# Patient Record
Sex: FEMALE | Race: WHITE | NOT HISPANIC OR LATINO | Employment: OTHER | URBAN - METROPOLITAN AREA
[De-identification: names, ages, dates, MRNs, and addresses within clinical notes are randomized per-mention and may not be internally consistent; named-entity substitution may affect disease eponyms.]

---

## 2017-09-07 ENCOUNTER — AMBULATORY - HEALTHEAST (OUTPATIENT)
Dept: LAB | Facility: HOSPITAL | Age: 57
End: 2017-09-07

## 2017-09-07 DIAGNOSIS — Z94.2 LUNG REPLACED BY TRANSPLANT (H): ICD-10-CM

## 2017-09-07 DIAGNOSIS — Z79.899 ENCOUNTER FOR LONG-TERM (CURRENT) USE OF OTHER MEDICATIONS: ICD-10-CM

## 2017-09-07 LAB
CREAT SERPL-MCNC: 0.82 MG/DL (ref 0.6–1.1)
GFR SERPL CREATININE-BSD FRML MDRD: >60 ML/MIN/1.73M2

## 2022-01-01 ENCOUNTER — HOSPITAL ENCOUNTER (EMERGENCY)
Facility: HOSPITAL | Age: 62
Discharge: HOME | End: 2022-03-01
Admitting: PHYSICIAN ASSISTANT
Payer: MEDICARE

## 2022-01-01 ENCOUNTER — APPOINTMENT (OUTPATIENT)
Dept: CT IMAGING | Facility: HOSPITAL | Age: 62
End: 2022-01-01
Attending: EMERGENCY MEDICINE
Payer: MEDICARE

## 2022-01-01 VITALS
DIASTOLIC BLOOD PRESSURE: 75 MMHG | SYSTOLIC BLOOD PRESSURE: 127 MMHG | BODY MASS INDEX: 31.72 KG/M2 | HEIGHT: 61 IN | TEMPERATURE: 98.2 F | HEART RATE: 74 BPM | OXYGEN SATURATION: 92 % | WEIGHT: 168 LBS | RESPIRATION RATE: 27 BRPM

## 2022-01-01 DIAGNOSIS — J96.21 ACUTE ON CHRONIC RESPIRATORY FAILURE WITH HYPOXIA (H): ICD-10-CM

## 2022-01-01 DIAGNOSIS — J84.112 IPF (IDIOPATHIC PULMONARY FIBROSIS) (H): ICD-10-CM

## 2022-01-01 DIAGNOSIS — R79.89 ELEVATED BRAIN NATRIURETIC PEPTIDE (BNP) LEVEL: ICD-10-CM

## 2022-01-01 LAB
ANION GAP SERPL CALCULATED.3IONS-SCNC: 13 MMOL/L (ref 5–18)
ATRIAL RATE - MUSE: 72 BPM
BASOPHILS # BLD AUTO: 0 10E3/UL (ref 0–0.2)
BASOPHILS NFR BLD AUTO: 0 %
BNP SERPL-MCNC: 133 PG/ML (ref 0–98)
BUN SERPL-MCNC: 24 MG/DL (ref 8–22)
CALCIUM SERPL-MCNC: 8.7 MG/DL (ref 8.5–10.5)
CHLORIDE BLD-SCNC: 103 MMOL/L (ref 98–107)
CO2 SERPL-SCNC: 22 MMOL/L (ref 22–31)
CREAT SERPL-MCNC: 1.48 MG/DL (ref 0.6–1.1)
DIASTOLIC BLOOD PRESSURE - MUSE: NORMAL MMHG
EOSINOPHIL # BLD AUTO: 0 10E3/UL (ref 0–0.7)
EOSINOPHIL NFR BLD AUTO: 0 %
ERYTHROCYTE [DISTWIDTH] IN BLOOD BY AUTOMATED COUNT: 16.7 % (ref 10–15)
FLUAV RNA SPEC QL NAA+PROBE: NEGATIVE
FLUBV RNA RESP QL NAA+PROBE: NEGATIVE
GFR SERPL CREATININE-BSD FRML MDRD: 40 ML/MIN/1.73M2
GLUCOSE BLD-MCNC: 147 MG/DL (ref 70–125)
HCT VFR BLD AUTO: 29.4 % (ref 35–47)
HGB BLD-MCNC: 8.8 G/DL (ref 11.7–15.7)
HOLD SPECIMEN: NORMAL
IMM GRANULOCYTES # BLD: 0.1 10E3/UL
IMM GRANULOCYTES NFR BLD: 2 %
INTERPRETATION ECG - MUSE: NORMAL
LYMPHOCYTES # BLD AUTO: 1.9 10E3/UL (ref 0.8–5.3)
LYMPHOCYTES NFR BLD AUTO: 49 %
MCH RBC QN AUTO: 31.8 PG (ref 26.5–33)
MCHC RBC AUTO-ENTMCNC: 29.9 G/DL (ref 31.5–36.5)
MCV RBC AUTO: 106 FL (ref 78–100)
MONOCYTES # BLD AUTO: 0.4 10E3/UL (ref 0–1.3)
MONOCYTES NFR BLD AUTO: 11 %
NEUTROPHILS # BLD AUTO: 1.5 10E3/UL (ref 1.6–8.3)
NEUTROPHILS NFR BLD AUTO: 38 %
NRBC # BLD AUTO: 0 10E3/UL
NRBC BLD AUTO-RTO: 0 /100
P AXIS - MUSE: 50 DEGREES
PLATELET # BLD AUTO: 227 10E3/UL (ref 150–450)
POTASSIUM BLD-SCNC: 4.4 MMOL/L (ref 3.5–5)
PR INTERVAL - MUSE: 150 MS
QRS DURATION - MUSE: 86 MS
QT - MUSE: 412 MS
QTC - MUSE: 451 MS
R AXIS - MUSE: 11 DEGREES
RBC # BLD AUTO: 2.77 10E6/UL (ref 3.8–5.2)
SARS-COV-2 RNA RESP QL NAA+PROBE: NEGATIVE
SODIUM SERPL-SCNC: 138 MMOL/L (ref 136–145)
SYSTOLIC BLOOD PRESSURE - MUSE: NORMAL MMHG
T AXIS - MUSE: 34 DEGREES
TROPONIN I SERPL-MCNC: 0.01 NG/ML (ref 0–0.29)
VENTRICULAR RATE- MUSE: 72 BPM
WBC # BLD AUTO: 3.8 10E3/UL (ref 4–11)

## 2022-01-01 PROCEDURE — C9803 HOPD COVID-19 SPEC COLLECT: HCPCS

## 2022-01-01 PROCEDURE — 250N000011 HC RX IP 250 OP 636: Performed by: EMERGENCY MEDICINE

## 2022-01-01 PROCEDURE — 99285 EMERGENCY DEPT VISIT HI MDM: CPT | Mod: 25

## 2022-01-01 PROCEDURE — 71275 CT ANGIOGRAPHY CHEST: CPT

## 2022-01-01 PROCEDURE — 87636 SARSCOV2 & INF A&B AMP PRB: CPT | Performed by: PHYSICIAN ASSISTANT

## 2022-01-01 PROCEDURE — 85025 COMPLETE CBC W/AUTO DIFF WBC: CPT | Performed by: EMERGENCY MEDICINE

## 2022-01-01 PROCEDURE — 83880 ASSAY OF NATRIURETIC PEPTIDE: CPT | Performed by: PHYSICIAN ASSISTANT

## 2022-01-01 PROCEDURE — 84484 ASSAY OF TROPONIN QUANT: CPT | Performed by: EMERGENCY MEDICINE

## 2022-01-01 PROCEDURE — 93005 ELECTROCARDIOGRAM TRACING: CPT | Performed by: EMERGENCY MEDICINE

## 2022-01-01 PROCEDURE — 82310 ASSAY OF CALCIUM: CPT | Performed by: EMERGENCY MEDICINE

## 2022-01-01 PROCEDURE — 36415 COLL VENOUS BLD VENIPUNCTURE: CPT | Performed by: STUDENT IN AN ORGANIZED HEALTH CARE EDUCATION/TRAINING PROGRAM

## 2022-01-01 RX ORDER — IOPAMIDOL 755 MG/ML
75 INJECTION, SOLUTION INTRAVASCULAR ONCE
Status: COMPLETED | OUTPATIENT
Start: 2022-01-01 | End: 2022-01-01

## 2022-01-01 RX ADMIN — IOPAMIDOL 75 ML: 755 INJECTION, SOLUTION INTRAVENOUS at 18:11

## 2022-01-01 ASSESSMENT — ENCOUNTER SYMPTOMS
DYSURIA: 0
NAUSEA: 0
FEVER: 0
CHILLS: 0
HEMATURIA: 0
NERVOUS/ANXIOUS: 0
SORE THROAT: 0
ABDOMINAL PAIN: 0
LIGHT-HEADEDNESS: 0
DIARRHEA: 0
FREQUENCY: 0
HEADACHES: 0
COUGH: 1
SHORTNESS OF BREATH: 1
MYALGIAS: 0
VOMITING: 0

## 2022-03-01 NOTE — ED TRIAGE NOTES
Sob and recommended by care coordinator nurse to come in for eval to check if she has a PE. Patient states home oxygen has been alittle bit lower as well then here baseline.

## 2022-03-01 NOTE — ED PROVIDER NOTES
ED triage provider note    Patient here for evaluation of dyspnea.  She has a history of chronic respiratory failure on oxygen.  She has had a lung transplant.  Recent positive D-dimer that was not followed up on    She is not in any distress.  Pulse ox is a bit low.    Diagnostics ordered including CT of the chest to rule out PE     Lit Reeves MD  03/01/22 4978

## 2022-03-02 NOTE — ED NOTES
RN spoke with patient regarding reason for ER visit. Pt said she was told to come to the ER by her provider for elevated d-dimer results. 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 COVID swab done and sent to lab.

## 2022-03-02 NOTE — DISCHARGE INSTRUCTIONS
You were seen in the emergency department for low oxygen levels. Your heart failure marker was a little elevated. This could also be from your idiopathic pulmonary fibrosis.    Take care of yourself at home.  - use oxygen as needed    For pain or fever you may take:  - Ibuprofen 600 mg every 6 hours. Max 3200 mg in 24 hours  - Please take this medication with food as it can cause an upset stomach  - Please do not take this medication if you have a history of a GI bleed or kidney problems  - Tylenol 650 mg every 6 hours. Max 4000 mg in 24 hours.   - Please do not take this medication with alcohol as it can cause problems with your liver.  - You can take both of these medications at the same time or alternate them every 3 hours. For example, tylenol at 6a, ibuprofen at 9a, tylenol at 12p, ibuprofen at 3p, etc.     Please follow up with your [facility] team as soon as possible to ensure your symptoms are improving.    Return to the emergency department if:  - You develop a fever (100.4F or above)  - Your symptoms worsen  - Severe leg swelling  - Unable to meet oxygen needs at home  - chest pain  - fainting  - Or with any other concerning symptom

## 2022-03-02 NOTE — ED PROVIDER NOTES
Emergency Department Encounter   NAME: Karo Carty ; AGE: 61 year old female ; YOB: 1960 ; MRN: [id number] ; EVALUATION DATE & TIME: 3/1/2022  6:08 PM ; PCP: No Ref-Primary, Physician   ED PROVIDER: Shahrzad Jansen PA-C    Chief Complaint   Patient presents with     Shortness of Breath       Medical Decision Making & Final Diagnosis   No diagnosis found.     ED Course as of 03/01/22 2220   Tue Mar 01, 2022   2212 Karo is a 61 year old female with a relevant PMH of idiopathic pulmonary fibrosis s/p L sided lung transplant on immunosuppression therapy, CAD, h/o etoh abuse, HLD, hypothyroidism who presents to the ED for evaluation of  Increased oxygen needs and shortness of breath.     My exam is notable for oxygen saturation in the low 90s on room air.  Clear lung sounds.  Mild 1+ pitting edema to bilateral lower extremities without tenderness.  No JVD.   2213 EKG reveals sinus rhythm.  No acute ST elevation or depression.  No pathologic arrhythmia.  Labs notable for leukopenia at baseline at 3.8, hemoglobin 8.8 (down from 10 9 days ago, , negative troponin, and slight increase in creatinine to 1.48 up from 1.38 days ago.  Covid and influenza negative.  CT PE study reveals idiopathic pulmonary fibrosis without significant change from baseline PE.   2214 Patient notes 2 weeks of increasing oxygen needs and shortness of breath.  Clinically she looks well and does have oxygen at home.  She is intermittently maintained her oxygen saturation on room air but with ambulation does D-Stat.  She is closely followed by [facility] and they recommended she come in today for PE rule out.     2215 PE study negative for acute abnormality.  No pneumonia, pleural effusion, or increased pulmonary vasculature.  BNP mildly elevated and mild lower extremity swelling.  She does have an echo planned next week with [facility].  Nothing to suggest fulminant heart failure at this time and do not believe there is indication for  diuresis or for acute heart failure work-up especially with ultrasound outpatient next week.  Low suspicion of infectious source with negative Covid, influenza, and negative CT for consolidation.  She denies any melena or bloody stool and is not anticoagulated.  Her hemoglobin is slightly down from baseline but not within a range that would make me most suspicious for occult GI bleed leading to severe anemia with symptomatic hypoxia.  No metabolic derangement leading to shortness of breath.  Nothing to suggest cardiac arrhythmia.  Likely secondary to worsening idiopathic pulmonary fibrosis.  May be component of heart failure.  I offered admission for observation given increased oxygen needs but patient declined.  She does have very close follow-up with [facility].  She states she can discuss with them tomorrow.  She said they could move her appointment up if her symptoms worsen.  She does have a battery of test scheduled next week and they recommended presenting to [place] ED if worsening.  I did attempt to contact her pulmonary team but unfortunately was connected with around 1.  Given the patient is so well-appearing here, is stable without oxygen here, has oxygen at home so she close follow-up with her care team I do think it is reasonable to disposition outpatient.  We discussed strict return precautions and she was given written verbal discharge instructions          ED Course   6:11 PM I attempted to meet and introduce myself to the patient.She is in CT. Will try again.  I did see the patient while wearing full COVID-compliant PPE.  6:28 PM Evaluated patient at the bedside  7:54 PM Discussed results. Discussed disposition.   8:10 PM Dr. Barahona from the general pulmonology team responded to the page. He recommends discussing with lung transplant team.       MEDICATIONS GIVEN IN THE EMERGENCY:   Medications   iopamidol (ISOVUE-370) solution 75 mL (75 mLs Intravenous Given 3/1/22 1811)      NEW PRESCRIPTIONS STARTED  AT TODAY'S ER VISIT:  New Prescriptions    No medications on file     =================================================================   History   Patient information was obtained from: patient   Use of Intrepreter: N/A    Karo Carty is a 61 year old female with a relevant PMH of idiopathic pulmonary fibrosis s/p L sided lung transplant on immunosuppression therapy, CAD, h/o etoh abuse, HLD, hypothyroidism who presents to the ED for evaluation of  Increased oxygen needs and shortness of breath.   2 weeks ago patient would occasionally use oxygen at home with increased activity.  In the last 2 weeks she has had significant increase shortness of breath and need for oxygen even when at rest.  She is waking up with oxygen saturation in the low 80s.  She reports left-sided rib pain which has been present since her original lung transplant in 2017.  Also notes mild lower extremity edema that has developed.  She has a chronic cough which is unchanged.  She has a as needed inhalers which she has not felt the need to increase the use of.  No sick contacts.  Denies any other complaints.  Has been vaccinated against COVID-19 x3.  Notes chronic headache and myalgias which have come and gone for months.  ______________________________________________________________________  No past medical history on file.     Past Surgical History:   Procedure Laterality Date     LUNG BIOPSY Left        No family history on file.    Social History     Tobacco Use     Smoking status: Former Smoker     Quit date: 2006     Years since quittin.1     Smokeless tobacco: Not on file   Substance Use Topics     Alcohol use: No     Comment: Alcoholic Drinks/day: History of alcohol abuse- in remission     Drug use: No       REVIEW OF SYSTEMS:    Review of Systems   Constitutional: Negative for chills and fever.   HENT: Negative for sore throat.    Eyes: Negative for visual disturbance.   Respiratory: Positive for cough and shortness of  "breath.    Cardiovascular: Negative for chest pain.   Gastrointestinal: Negative for abdominal pain, diarrhea, nausea and vomiting.   Genitourinary: Negative for dysuria, frequency, hematuria and urgency.   Musculoskeletal: Negative for myalgias.   Skin: Negative for rash.   Neurological: Negative for light-headedness and headaches.   Psychiatric/Behavioral: The patient is not nervous/anxious.    All other systems reviewed and are negative.        Physical Exam   /72   Pulse 61   Temp 97.8  F (36.6  C) (Oral)   Resp 22   Ht 1.549 m (5' 1\")   Wt 76.2 kg (168 lb)   LMP  (LMP Unknown)   SpO2 94%   BMI 31.74 kg/m      Physical Exam  Constitutional:       General: She is not in acute distress.     Appearance: Normal appearance.      Comments: Normal vital signs in a nontoxic appearing woman.    HENT:      Head: Normocephalic.   Eyes:      Conjunctiva/sclera: Conjunctivae normal.   Cardiovascular:      Rate and Rhythm: Normal rate and regular rhythm.      Heart sounds: Normal heart sounds.   Pulmonary:      Effort: Pulmonary effort is normal. No respiratory distress.      Breath sounds: Normal breath sounds. No wheezing, rhonchi or rales.      Comments: 91% on RA resting in bed  Abdominal:      General: There is no distension.      Palpations: Abdomen is soft.      Tenderness: There is no abdominal tenderness. There is no guarding or rebound.   Musculoskeletal:         General: No swelling or deformity. Normal range of motion.      Cervical back: Normal range of motion.      Comments: 1+ pitting edema to BLEs without tenderness   Skin:     General: Skin is warm.   Neurological:      General: No focal deficit present.      Mental Status: She is alert.   Psychiatric:         Mood and Affect: Mood normal.         Lab Work (Reviewed and Interpreted):   Labs Ordered and Resulted from Time of ED Arrival to Time of ED Departure   BASIC METABOLIC PANEL - Abnormal       Result Value    Sodium 138      Potassium 4.4   "    Chloride 103      Carbon Dioxide (CO2) 22      Anion Gap 13      Urea Nitrogen 24 (*)     Creatinine 1.48 (*)     Calcium 8.7      Glucose 147 (*)     GFR Estimate 40 (*)    CBC WITH PLATELETS AND DIFFERENTIAL - Abnormal    WBC Count 3.8 (*)     RBC Count 2.77 (*)     Hemoglobin 8.8 (*)     Hematocrit 29.4 (*)      (*)     MCH 31.8      MCHC 29.9 (*)     RDW 16.7 (*)     Platelet Count 227      % Neutrophils 38      % Lymphocytes 49      % Monocytes 11      % Eosinophils 0      % Basophils 0      % Immature Granulocytes 2      NRBCs per 100 WBC 0      Absolute Neutrophils 1.5 (*)     Absolute Lymphocytes 1.9      Absolute Monocytes 0.4      Absolute Eosinophils 0.0      Absolute Basophils 0.0      Absolute Immature Granulocytes 0.1      Absolute NRBCs 0.0     TROPONIN I       Imaging (Reviewed and Interpreted):   CT Chest Pulmonary Embolism w Contrast    (Results Pending)       EKG (Reviewed and Interpreted):   Performed at: 16:51  Impression: Normal sinus rhythm  Normal ECG  Rate: 72  Rhythm: Normal sinus  Axis: 11  OK Interval: 150  QRS Interval: 86  QTc Interval: 451  Comparison: When compared from 15-Sep-2016, no significant change was found.     EKG results reviewed and interpreted by Dr. Shen, ED MD.     I, Sahil Altamirano, am serving as a scribe to document services personally performed by Shahrzad Jansen PA-C, based on my observation and the provider's statements to me. Shahrzad MERRILL PA-C attest that Sahil Bonilla acting in a scribe capacity, has observed my performance of the services and has documented them in accordance with my direction.     Shahrzad Jansen PA-C   Emergency Medicine   [facility] EMERGENCY DEPARTMENT  [facility] [facility] [address]  [phone]  Dept: [phone]        Shahrzad Jansen PA-C  03/01/22 7820